# Patient Record
Sex: MALE | Race: WHITE | NOT HISPANIC OR LATINO | Employment: UNEMPLOYED | URBAN - NONMETROPOLITAN AREA
[De-identification: names, ages, dates, MRNs, and addresses within clinical notes are randomized per-mention and may not be internally consistent; named-entity substitution may affect disease eponyms.]

---

## 2017-03-24 ENCOUNTER — HOSPITAL ENCOUNTER (OUTPATIENT)
Dept: GENERAL RADIOLOGY | Facility: HOSPITAL | Age: 57
Discharge: HOME | End: 2017-03-24

## 2017-03-24 ENCOUNTER — HOSPITAL ENCOUNTER (OUTPATIENT)
Dept: GENERAL RADIOLOGY | Facility: HOSPITAL | Age: 57
Discharge: HOME | End: 2017-03-24
Admitting: FAMILY MEDICINE

## 2017-03-24 ENCOUNTER — TRANSCRIBE ORDERS (OUTPATIENT)
Dept: GENERAL RADIOLOGY | Facility: HOSPITAL | Age: 57
End: 2017-03-24

## 2017-03-24 DIAGNOSIS — R52 PAIN: ICD-10-CM

## 2017-03-24 DIAGNOSIS — R52 PAIN: Primary | ICD-10-CM

## 2017-03-24 PROCEDURE — 72110 X-RAY EXAM L-2 SPINE 4/>VWS: CPT

## 2017-03-24 PROCEDURE — 73502 X-RAY EXAM HIP UNI 2-3 VIEWS: CPT

## 2017-03-24 PROCEDURE — 72050 X-RAY EXAM NECK SPINE 4/5VWS: CPT

## 2017-03-29 ENCOUNTER — APPOINTMENT (OUTPATIENT)
Dept: MRI IMAGING | Facility: HOSPITAL | Age: 57
End: 2017-03-29

## 2017-03-29 ENCOUNTER — HOSPITAL ENCOUNTER (EMERGENCY)
Facility: HOSPITAL | Age: 57
Discharge: HOME | End: 2017-03-29
Attending: EMERGENCY MEDICINE | Admitting: EMERGENCY MEDICINE

## 2017-03-29 VITALS
SYSTOLIC BLOOD PRESSURE: 152 MMHG | HEIGHT: 72 IN | WEIGHT: 152 LBS | OXYGEN SATURATION: 96 % | DIASTOLIC BLOOD PRESSURE: 93 MMHG | HEART RATE: 78 BPM | RESPIRATION RATE: 18 BRPM | BODY MASS INDEX: 20.59 KG/M2 | TEMPERATURE: 98.1 F

## 2017-03-29 DIAGNOSIS — G89.29 CHRONIC RIGHT-SIDED LOW BACK PAIN WITH RIGHT-SIDED SCIATICA: ICD-10-CM

## 2017-03-29 DIAGNOSIS — M51.26 LUMBAR HERNIATED DISC: Primary | ICD-10-CM

## 2017-03-29 DIAGNOSIS — M54.41 CHRONIC RIGHT-SIDED LOW BACK PAIN WITH RIGHT-SIDED SCIATICA: ICD-10-CM

## 2017-03-29 LAB
ALBUMIN SERPL-MCNC: 4 G/DL (ref 3.2–4.8)
ALBUMIN/GLOB SERPL: 1.7 G/DL (ref 1.5–2.5)
ALP SERPL-CCNC: 140 U/L (ref 25–100)
ALT SERPL W P-5'-P-CCNC: 18 U/L (ref 7–40)
AMPHET+METHAMPHET UR QL: NEGATIVE
AMPHETAMINES UR QL: NEGATIVE
ANION GAP SERPL CALCULATED.3IONS-SCNC: 5 MMOL/L (ref 3–11)
APTT PPP: 25.6 SECONDS (ref 24–31)
AST SERPL-CCNC: 14 U/L (ref 0–33)
BACTERIA UR QL AUTO: ABNORMAL /HPF
BARBITURATES UR QL SCN: NEGATIVE
BASOPHILS # BLD AUTO: 0 10*3/MM3 (ref 0–0.2)
BASOPHILS NFR BLD AUTO: 0 % (ref 0–1)
BENZODIAZ UR QL SCN: POSITIVE
BILIRUB SERPL-MCNC: 0.1 MG/DL (ref 0.3–1.2)
BILIRUB UR QL STRIP: NEGATIVE
BUN BLD-MCNC: 23 MG/DL (ref 9–23)
BUN/CREAT SERPL: 17.7 (ref 7–25)
BUPRENORPHINE SERPL-MCNC: NEGATIVE NG/ML
CALCIUM SPEC-SCNC: 9.1 MG/DL (ref 8.7–10.4)
CANNABINOIDS SERPL QL: NEGATIVE
CHLORIDE SERPL-SCNC: 113 MMOL/L (ref 99–109)
CLARITY UR: ABNORMAL
CO2 SERPL-SCNC: 24 MMOL/L (ref 20–31)
COCAINE UR QL: NEGATIVE
COLOR UR: YELLOW
CREAT BLD-MCNC: 1.3 MG/DL (ref 0.6–1.3)
DEPRECATED RDW RBC AUTO: 55.9 FL (ref 37–54)
EOSINOPHIL # BLD AUTO: 0 10*3/MM3 (ref 0.1–0.3)
EOSINOPHIL NFR BLD AUTO: 0 % (ref 0–3)
ERYTHROCYTE [DISTWIDTH] IN BLOOD BY AUTOMATED COUNT: 14.8 % (ref 11.3–14.5)
GFR SERPL CREATININE-BSD FRML MDRD: 58 ML/MIN/1.73
GLOBULIN UR ELPH-MCNC: 2.3 GM/DL
GLUCOSE BLD-MCNC: 171 MG/DL (ref 70–100)
GLUCOSE UR STRIP-MCNC: NEGATIVE MG/DL
HCT VFR BLD AUTO: 40.2 % (ref 38.9–50.9)
HGB BLD-MCNC: 13.3 G/DL (ref 13.1–17.5)
HGB UR QL STRIP.AUTO: NEGATIVE
HOLD SPECIMEN: NORMAL
HOLD SPECIMEN: NORMAL
HYALINE CASTS UR QL AUTO: ABNORMAL /LPF
IMM GRANULOCYTES # BLD: 0.05 10*3/MM3 (ref 0–0.03)
IMM GRANULOCYTES NFR BLD: 0.4 % (ref 0–0.6)
INR PPP: 0.89
KETONES UR QL STRIP: NEGATIVE
LEUKOCYTE ESTERASE UR QL STRIP.AUTO: NEGATIVE
LYMPHOCYTES # BLD AUTO: 0.77 10*3/MM3 (ref 0.6–4.8)
LYMPHOCYTES NFR BLD AUTO: 6.4 % (ref 24–44)
MCH RBC QN AUTO: 34.3 PG (ref 27–31)
MCHC RBC AUTO-ENTMCNC: 33.1 G/DL (ref 32–36)
MCV RBC AUTO: 103.6 FL (ref 80–99)
METHADONE UR QL SCN: NEGATIVE
MONOCYTES # BLD AUTO: 0.08 10*3/MM3 (ref 0–1)
MONOCYTES NFR BLD AUTO: 0.7 % (ref 0–12)
NEUTROPHILS # BLD AUTO: 11.2 10*3/MM3 (ref 1.5–8.3)
NEUTROPHILS NFR BLD AUTO: 92.5 % (ref 41–71)
NITRITE UR QL STRIP: NEGATIVE
OPIATES UR QL: POSITIVE
OXYCODONE UR QL SCN: NEGATIVE
PCP UR QL SCN: NEGATIVE
PH UR STRIP.AUTO: 6.5 [PH] (ref 5–8)
PLATELET # BLD AUTO: 229 10*3/MM3 (ref 150–450)
PMV BLD AUTO: 10.6 FL (ref 6–12)
POTASSIUM BLD-SCNC: 4.5 MMOL/L (ref 3.5–5.5)
PROPOXYPH UR QL: NEGATIVE
PROT SERPL-MCNC: 6.3 G/DL (ref 5.7–8.2)
PROT UR QL STRIP: ABNORMAL
PROTHROMBIN TIME: 9.7 SECONDS (ref 9.6–11.5)
RBC # BLD AUTO: 3.88 10*6/MM3 (ref 4.2–5.76)
RBC # UR: ABNORMAL /HPF
REF LAB TEST METHOD: ABNORMAL
SODIUM BLD-SCNC: 142 MMOL/L (ref 132–146)
SP GR UR STRIP: >=1.03 (ref 1–1.03)
SQUAMOUS #/AREA URNS HPF: ABNORMAL /HPF
TRICYCLICS UR QL SCN: NEGATIVE
UROBILINOGEN UR QL STRIP: ABNORMAL
WBC NRBC COR # BLD: 12.1 10*3/MM3 (ref 3.5–10.8)
WBC UR QL AUTO: ABNORMAL /HPF
WHOLE BLOOD HOLD SPECIMEN: NORMAL
WHOLE BLOOD HOLD SPECIMEN: NORMAL

## 2017-03-29 PROCEDURE — 96374 THER/PROPH/DIAG INJ IV PUSH: CPT

## 2017-03-29 PROCEDURE — 96375 TX/PRO/DX INJ NEW DRUG ADDON: CPT

## 2017-03-29 PROCEDURE — 99284 EMERGENCY DEPT VISIT MOD MDM: CPT

## 2017-03-29 PROCEDURE — 85025 COMPLETE CBC W/AUTO DIFF WBC: CPT | Performed by: PHYSICIAN ASSISTANT

## 2017-03-29 PROCEDURE — 25010000002 HYDROMORPHONE PER 4 MG: Performed by: EMERGENCY MEDICINE

## 2017-03-29 PROCEDURE — 80306 DRUG TEST PRSMV INSTRMNT: CPT | Performed by: PHYSICIAN ASSISTANT

## 2017-03-29 PROCEDURE — 85730 THROMBOPLASTIN TIME PARTIAL: CPT | Performed by: PHYSICIAN ASSISTANT

## 2017-03-29 PROCEDURE — 81001 URINALYSIS AUTO W/SCOPE: CPT | Performed by: PHYSICIAN ASSISTANT

## 2017-03-29 PROCEDURE — 25010000002 LORAZEPAM PER 2 MG: Performed by: EMERGENCY MEDICINE

## 2017-03-29 PROCEDURE — 0 GADOBENATE DIMEGLUMINE 529 MG/ML SOLUTION: Performed by: EMERGENCY MEDICINE

## 2017-03-29 PROCEDURE — 96361 HYDRATE IV INFUSION ADD-ON: CPT

## 2017-03-29 PROCEDURE — 72158 MRI LUMBAR SPINE W/O & W/DYE: CPT

## 2017-03-29 PROCEDURE — 80053 COMPREHEN METABOLIC PANEL: CPT | Performed by: PHYSICIAN ASSISTANT

## 2017-03-29 PROCEDURE — A9577 INJ MULTIHANCE: HCPCS | Performed by: EMERGENCY MEDICINE

## 2017-03-29 PROCEDURE — 85610 PROTHROMBIN TIME: CPT | Performed by: PHYSICIAN ASSISTANT

## 2017-03-29 PROCEDURE — 96376 TX/PRO/DX INJ SAME DRUG ADON: CPT

## 2017-03-29 RX ORDER — OXYCODONE AND ACETAMINOPHEN 10; 325 MG/1; MG/1
1 TABLET ORAL EVERY 6 HOURS PRN
Qty: 24 TABLET | Refills: 0 | Status: SHIPPED | OUTPATIENT
Start: 2017-03-29 | End: 2017-05-18

## 2017-03-29 RX ORDER — CARBAMAZEPINE 200 MG/1
200 TABLET ORAL NIGHTLY
COMMUNITY
End: 2017-05-18

## 2017-03-29 RX ORDER — PREDNISONE 10 MG/1
10 TABLET ORAL DAILY
COMMUNITY

## 2017-03-29 RX ORDER — LORATADINE 10 MG/1
1 CAPSULE, LIQUID FILLED ORAL
COMMUNITY

## 2017-03-29 RX ORDER — LORAZEPAM 2 MG/ML
0.5 INJECTION INTRAMUSCULAR ONCE
Status: COMPLETED | OUTPATIENT
Start: 2017-03-29 | End: 2017-03-29

## 2017-03-29 RX ORDER — HYDROXYZINE HYDROCHLORIDE 25 MG/1
25 TABLET, FILM COATED ORAL 3 TIMES DAILY PRN
COMMUNITY

## 2017-03-29 RX ORDER — HYDROCODONE BITARTRATE AND ACETAMINOPHEN 10; 325 MG/1; MG/1
1 TABLET ORAL EVERY 8 HOURS
COMMUNITY

## 2017-03-29 RX ORDER — RANITIDINE 150 MG/1
150 TABLET ORAL 2 TIMES DAILY
COMMUNITY

## 2017-03-29 RX ORDER — SUMATRIPTAN 100 MG/1
100 TABLET, FILM COATED ORAL
COMMUNITY

## 2017-03-29 RX ORDER — TIZANIDINE 4 MG/1
4 TABLET ORAL NIGHTLY PRN
COMMUNITY

## 2017-03-29 RX ORDER — GABAPENTIN 800 MG/1
800 TABLET ORAL 4 TIMES DAILY
COMMUNITY

## 2017-03-29 RX ORDER — DIAZEPAM 10 MG/1
10 TABLET ORAL 2 TIMES DAILY
COMMUNITY

## 2017-03-29 RX ORDER — ESOMEPRAZOLE MAGNESIUM 40 MG/1
40 CAPSULE, DELAYED RELEASE ORAL
COMMUNITY

## 2017-03-29 RX ORDER — PROPRANOLOL HYDROCHLORIDE 80 MG/1
80 CAPSULE, EXTENDED RELEASE ORAL DAILY
COMMUNITY

## 2017-03-29 RX ORDER — METHYLPREDNISOLONE 4 MG/1
TABLET ORAL
Qty: 21 TABLET | Refills: 0 | Status: SHIPPED | OUTPATIENT
Start: 2017-03-29 | End: 2017-05-18

## 2017-03-29 RX ORDER — SODIUM CHLORIDE 0.9 % (FLUSH) 0.9 %
10 SYRINGE (ML) INJECTION AS NEEDED
Status: DISCONTINUED | OUTPATIENT
Start: 2017-03-29 | End: 2017-03-30 | Stop reason: HOSPADM

## 2017-03-29 RX ORDER — DICLOFENAC SODIUM 75 MG/1
75 TABLET, DELAYED RELEASE ORAL 2 TIMES DAILY
COMMUNITY

## 2017-03-29 RX ADMIN — LORAZEPAM 0.5 MG: 2 INJECTION, SOLUTION INTRAMUSCULAR; INTRAVENOUS at 18:21

## 2017-03-29 RX ADMIN — HYDROMORPHONE HYDROCHLORIDE 1 MG: 1 INJECTION, SOLUTION INTRAMUSCULAR; INTRAVENOUS; SUBCUTANEOUS at 18:17

## 2017-03-29 RX ADMIN — GADOBENATE DIMEGLUMINE 15 ML: 529 INJECTION, SOLUTION INTRAVENOUS at 19:33

## 2017-03-29 RX ADMIN — SODIUM CHLORIDE 1000 ML: 9 INJECTION, SOLUTION INTRAVENOUS at 20:01

## 2017-03-29 RX ADMIN — HYDROMORPHONE HYDROCHLORIDE 1 MG: 1 INJECTION, SOLUTION INTRAMUSCULAR; INTRAVENOUS; SUBCUTANEOUS at 18:58

## 2017-04-04 ENCOUNTER — DOCUMENTATION (OUTPATIENT)
Dept: NEUROSURGERY | Facility: CLINIC | Age: 57
End: 2017-04-04

## 2017-04-04 DIAGNOSIS — M43.16 SPONDYLOLISTHESIS AT L3-L4 LEVEL: ICD-10-CM

## 2017-04-04 DIAGNOSIS — M51.26 HNP (HERNIATED NUCLEUS PULPOSUS), LUMBAR: Primary | ICD-10-CM

## 2017-04-04 DIAGNOSIS — M43.10 ACQUIRED SPONDYLOLISTHESIS: ICD-10-CM

## 2017-04-04 DIAGNOSIS — M48.061 LUMBAR STENOSIS: ICD-10-CM

## 2017-05-18 ENCOUNTER — OFFICE VISIT (OUTPATIENT)
Dept: NEUROSURGERY | Facility: CLINIC | Age: 57
End: 2017-05-18

## 2017-05-18 VITALS
SYSTOLIC BLOOD PRESSURE: 110 MMHG | TEMPERATURE: 98.4 F | HEIGHT: 72 IN | DIASTOLIC BLOOD PRESSURE: 76 MMHG | WEIGHT: 170 LBS | BODY MASS INDEX: 23.03 KG/M2

## 2017-05-18 DIAGNOSIS — M51.26 HNP (HERNIATED NUCLEUS PULPOSUS), LUMBAR: ICD-10-CM

## 2017-05-18 DIAGNOSIS — M48.061 LUMBAR STENOSIS: ICD-10-CM

## 2017-05-18 DIAGNOSIS — M43.16 SPONDYLOLISTHESIS AT L3-L4 LEVEL: Primary | ICD-10-CM

## 2017-05-18 PROCEDURE — 99213 OFFICE O/P EST LOW 20 MIN: CPT | Performed by: PHYSICIAN ASSISTANT

## 2017-06-05 ENCOUNTER — DOCUMENTATION (OUTPATIENT)
Dept: NEUROSURGERY | Facility: CLINIC | Age: 57
End: 2017-06-05

## 2017-06-05 DIAGNOSIS — M43.16 SPONDYLOLISTHESIS AT L3-L4 LEVEL: Primary | ICD-10-CM

## 2017-06-05 NOTE — PROGRESS NOTES
"I explained in detail to Dr. Young was said in my office visit with Mr. Pitts.  With Mr. Pitts saying, \"I lack confidence with Dr. Young and did not want to have surgery with him again\".  With this understanding Dr. Young thinks that the patient should go for a second opinion to provider outside of our practice.  I discussed with the patient in the office visit that we do not typically refer patient's among our practice for second opinion due to very similar practice habits.  We will send his paperwork and necessary documentation to where he chooses.  However, I do not think with the current relationship that he and  Dr. Young share that it is in the patient's best interest for our practice to continue care.   "

## 2023-07-19 ENCOUNTER — HOSPITAL ENCOUNTER (EMERGENCY)
Facility: HOSPITAL | Age: 63
Discharge: HOME | End: 2023-07-19
Attending: EMERGENCY MEDICINE | Admitting: EMERGENCY MEDICINE
Payer: MEDICARE

## 2023-07-19 VITALS
WEIGHT: 160.8 LBS | RESPIRATION RATE: 18 BRPM | HEART RATE: 106 BPM | SYSTOLIC BLOOD PRESSURE: 150 MMHG | TEMPERATURE: 97.9 F | HEIGHT: 71 IN | OXYGEN SATURATION: 97 % | BODY MASS INDEX: 22.51 KG/M2 | DIASTOLIC BLOOD PRESSURE: 121 MMHG
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
## 2023-07-19 DIAGNOSIS — S01.312A TEAR OF LEFT EARLOBE, INITIAL ENCOUNTER: Primary | ICD-10-CM

## 2023-07-19 PROCEDURE — 99283 EMERGENCY DEPT VISIT LOW MDM: CPT

## 2023-07-19 PROCEDURE — 25010000002 TETANUS-DIPHTH-ACELL PERTUSSIS 5-2.5-18.5 LF-MCG/0.5 SUSPENSION PREFILLED SYRINGE: Performed by: PHYSICIAN ASSISTANT

## 2023-07-19 PROCEDURE — 90715 TDAP VACCINE 7 YRS/> IM: CPT | Performed by: PHYSICIAN ASSISTANT

## 2023-07-19 PROCEDURE — 90471 IMMUNIZATION ADMIN: CPT | Performed by: PHYSICIAN ASSISTANT

## 2023-07-19 RX ORDER — AMOXICILLIN AND CLAVULANATE POTASSIUM 875; 125 MG/1; MG/1
1 TABLET, FILM COATED ORAL ONCE
Status: COMPLETED | OUTPATIENT
Start: 2023-07-19 | End: 2023-07-19

## 2023-07-19 RX ORDER — AMOXICILLIN AND CLAVULANATE POTASSIUM 875; 125 MG/1; MG/1
1 TABLET, FILM COATED ORAL 2 TIMES DAILY
Qty: 13 TABLET | Refills: 0 | Status: SHIPPED | OUTPATIENT
Start: 2023-07-19 | End: 2023-07-26

## 2023-07-19 RX ADMIN — TETANUS TOXOID, REDUCED DIPHTHERIA TOXOID AND ACELLULAR PERTUSSIS VACCINE, ADSORBED 0.5 ML: 5; 2.5; 8; 8; 2.5 SUSPENSION INTRAMUSCULAR at 09:51

## 2023-07-19 RX ADMIN — AMOXICILLIN AND CLAVULANATE POTASSIUM 1 TABLET: 875; 125 TABLET, FILM COATED ORAL at 09:51

## 2023-07-19 NOTE — ED PROVIDER NOTES
Subjective:  Chief Complaint:  Dog bite    History of Present Illness:  Patient is a 63-year-old male with history of GERD, fractures, hypertension, migraines, among others presenting to the ER with complaints of a dog bite to his left ear.  Patient states the dogs were up-to-date on rabies vaccines.  He states that he was trying to break up a dog fight and got bit on the left earlobe.  He states he does not feel there is anything to suture as the chunk of the earlobe is missing.  He states that it was just bleeding heavily and he thought he may need a bandage.  Also is unsure if his tetanus is up-to-date.  Denies any allergies to any antibiotics.      Nurses Notes reviewed and agree, including vitals, allergies, social history and prior medical history.     REVIEW OF SYSTEMS: All systems reviewed and not pertinent unless noted.  Review of Systems   HENT:  Positive for ear pain.    All other systems reviewed and are negative.    Past Medical History:   Diagnosis Date    Allergic     Chronic back pain     GERD (gastroesophageal reflux disease)     Hx of fracture of fibula     right leg    Hx of fracture of finger     right hand    Hx of fracture of left hip     Hx of fracture of tibia     right    Hypertension     Migraines        Allergies:    Codeine      Past Surgical History:   Procedure Laterality Date    LUMBAR DECOMPRESSION  08/06/2015    Extension of fusion with decompression at the L3-L4 level (Dr. Young)    LUMBAR FUSION  2007    Fusion L4/5 (in [place])         Social History     Socioeconomic History    Marital status:    Tobacco Use    Smoking status: Every Day     Packs/day: 1.00     Types: Cigarettes   Substance and Sexual Activity    Alcohol use: No    Drug use: No    Sexual activity: Defer         History reviewed. No pertinent family history.    Objective  Physical Exam:  BP (!) 150/121 (BP Location: Left arm, Patient Position: Sitting)   Pulse 106   Temp 97.9 °F (36.6 °C) (Oral)   Resp  "18   Ht 180.3 cm (71\")   Wt 72.9 kg (160 lb 12.8 oz)   SpO2 97%   BMI 22.43 kg/m²      Physical Exam  Vitals and nursing note reviewed.   Constitutional:       General: He is not in acute distress.     Appearance: He is not toxic-appearing.   HENT:      Head: Normocephalic and atraumatic.      Right Ear: External ear normal.      Ears:      Comments: Avulsion injury to left earlobe, active bleeding     Nose: Nose normal.   Eyes:      Extraocular Movements: Extraocular movements intact.      Conjunctiva/sclera: Conjunctivae normal.   Cardiovascular:      Rate and Rhythm: Tachycardia present.   Pulmonary:      Effort: Pulmonary effort is normal. No respiratory distress.   Abdominal:      General: There is no distension.      Palpations: Abdomen is soft.   Musculoskeletal:         General: Normal range of motion.      Cervical back: Normal range of motion and neck supple.   Skin:     General: Skin is warm and dry.   Neurological:      General: No focal deficit present.      Mental Status: He is alert and oriented to person, place, and time.   Psychiatric:         Mood and Affect: Mood normal.         Behavior: Behavior normal.       Laceration Repair    Date/Time: 7/19/2023 9:58 AM  Performed by: Pamela Astorga PA-C  Authorized by: Mikki Mullen MD     Consent:     Consent obtained:  Verbal    Consent given by:  Patient    Risks, benefits, and alternatives were discussed: yes      Risks discussed:  Infection, poor cosmetic result, pain and poor wound healing  Universal protocol:     Patient identity confirmed:  Verbally with patient  Anesthesia:     Anesthesia method:  Local infiltration    Local anesthetic:  Lidocaine 1% w/o epi  Laceration details:     Location:  Ear    Ear location:  L ear    Length (cm):  2    Depth (mm):  2  Pre-procedure details:     Preparation:  Patient was prepped and draped in usual sterile fashion  Exploration:     Hemostasis achieved with:  Direct pressure    Wound " exploration: wound explored through full range of motion      Wound extent: no foreign bodies/material noted    Treatment:     Area cleansed with:  Chlorhexidine    Amount of cleaning:  Extensive    Irrigation solution:  Sterile saline    Irrigation volume:  20mL    Irrigation method:  Syringe    Debridement:  None    Undermining:  None  Skin repair:     Repair method:  Sutures    Suture size:  6-0    Suture material:  Nylon    Suture technique:  Figure eight and simple interrupted    Number of sutures:  6  Approximation:     Approximation:  Close  Repair type:     Repair type:  Simple  Post-procedure details:     Dressing: kerlix and ace bandage.    Procedure completion:  Tolerated well, no immediate complications    ED Course:         Lab Results (last 24 hours)       ** No results found for the last 24 hours. **             No radiology results from the last 24 hrs       MDM  Patient was evaluated in the ER for left earlobe laceration after dog bite.  It appears to be a avulsion injury.  He is hypertensive and mildly tachycardic at 106 bpm but otherwise hemodynamically stable, afebrile, alert and oriented.  Differential diagnosis includes but is not limited to superficial laceration, complex laceration, avulsion of earlobe, among others.  Initial plan includes tetanus update, initiation of Augmentin with first dose given in the ER.  There is nothing to suture on the ear.  Will plan to clean with chlorhexidine and place Surgicel and pressure dressing on the wound.    Cleaned the earlobe and try to place Surgicel but it bled through the Surgicel.  Upon further evaluation, it appears there was a small arterial bleed from the earlobe.  Given this, 2 figure-of-eight stitches were placed as well as some simple interrupted.  Bleeding did stop.  Pressure dressing was placed over the ear and patient advised on wound care and advised to leave the pressure dressing in place for 1 to 2 days.  He was advised to have wound  rechecked and sutures removed in 5 to 7 days.  He was given a prescription for Augmentin.  Advised to return to the ER for any new or worsening symptoms or acute concerns.    Final diagnoses:   Tear of left earlobe, initial encounter   Dog bite, initial encounter          Pamela Astorga PA-C  07/19/23 1002

## 2023-07-19 NOTE — DISCHARGE INSTRUCTIONS
Keep pressure dressing in place for 1 to 2 days.  Leave sutures in place for 5 to 7 days.  Have wound rechecked and sutures removed in 5 to 7 days.  Keep the area clean.  Take antibiotics as prescribed.  Follow-up with your PCP as needed.  Return to the ER for any new or worsening symptoms or acute concerns.

## 2023-08-18 ENCOUNTER — TRANSCRIBE ORDERS (OUTPATIENT)
Dept: ADMINISTRATIVE | Facility: HOSPITAL | Age: 63
End: 2023-08-18
Payer: MEDICARE

## 2023-08-18 DIAGNOSIS — E78.5 HYPERLIPIDEMIA, UNSPECIFIED HYPERLIPIDEMIA TYPE: Primary | ICD-10-CM

## 2023-08-18 DIAGNOSIS — R10.9 ABDOMINAL PAIN, UNSPECIFIED ABDOMINAL LOCATION: ICD-10-CM

## 2023-08-18 DIAGNOSIS — K21.9 GERD WITHOUT ESOPHAGITIS: ICD-10-CM

## 2023-08-18 DIAGNOSIS — R53.83 FATIGUE, UNSPECIFIED TYPE: ICD-10-CM

## 2023-08-21 ENCOUNTER — TRANSCRIBE ORDERS (OUTPATIENT)
Dept: ADMINISTRATIVE | Facility: HOSPITAL | Age: 63
End: 2023-08-21
Payer: MEDICARE

## 2023-08-21 DIAGNOSIS — Z87.891 PERSONAL HISTORY OF NICOTINE DEPENDENCE: ICD-10-CM

## 2023-08-21 DIAGNOSIS — Z12.2 ENCOUNTER FOR SCREENING FOR MALIGNANT NEOPLASM OF RESPIRATORY ORGANS: Primary | ICD-10-CM

## 2023-08-31 ENCOUNTER — HOSPITAL ENCOUNTER (OUTPATIENT)
Dept: CT IMAGING | Facility: HOSPITAL | Age: 63
Discharge: HOME | End: 2023-08-31
Payer: MEDICARE

## 2023-08-31 DIAGNOSIS — Z12.2 ENCOUNTER FOR SCREENING FOR MALIGNANT NEOPLASM OF RESPIRATORY ORGANS: ICD-10-CM

## 2023-08-31 DIAGNOSIS — K21.9 GERD WITHOUT ESOPHAGITIS: ICD-10-CM

## 2023-08-31 DIAGNOSIS — Z87.891 PERSONAL HISTORY OF NICOTINE DEPENDENCE: ICD-10-CM

## 2023-08-31 DIAGNOSIS — R10.9 ABDOMINAL PAIN, UNSPECIFIED ABDOMINAL LOCATION: ICD-10-CM

## 2023-08-31 DIAGNOSIS — E78.5 HYPERLIPIDEMIA, UNSPECIFIED HYPERLIPIDEMIA TYPE: ICD-10-CM

## 2023-08-31 DIAGNOSIS — R53.83 FATIGUE, UNSPECIFIED TYPE: ICD-10-CM

## 2023-08-31 PROCEDURE — 74176 CT ABD & PELVIS W/O CONTRAST: CPT

## 2023-08-31 PROCEDURE — 71271 CT THORAX LUNG CANCER SCR C-: CPT

## 2023-10-03 ENCOUNTER — OFFICE VISIT (OUTPATIENT)
Dept: UROLOGY | Facility: CLINIC | Age: 63
End: 2023-10-03
Payer: MEDICARE

## 2023-10-03 VITALS
HEART RATE: 98 BPM | BODY MASS INDEX: 22.4 KG/M2 | WEIGHT: 160 LBS | HEIGHT: 71 IN | OXYGEN SATURATION: 97 % | DIASTOLIC BLOOD PRESSURE: 90 MMHG | SYSTOLIC BLOOD PRESSURE: 124 MMHG | TEMPERATURE: 99 F

## 2023-10-03 DIAGNOSIS — N20.0 KIDNEY STONES: Primary | ICD-10-CM

## 2023-10-03 LAB
BILIRUB BLD-MCNC: NEGATIVE MG/DL
CLARITY, POC: CLEAR
COLOR UR: YELLOW
EXPIRATION DATE: ABNORMAL
GLUCOSE UR STRIP-MCNC: NEGATIVE MG/DL
KETONES UR QL: NEGATIVE
LEUKOCYTE EST, POC: NEGATIVE
Lab: ABNORMAL
NITRITE UR-MCNC: NEGATIVE MG/ML
PH UR: 5 [PH] (ref 5–8)
PROT UR STRIP-MCNC: ABNORMAL MG/DL
RBC # UR STRIP: NEGATIVE /UL
SP GR UR: 1.02 (ref 1–1.03)
UROBILINOGEN UR QL: NORMAL

## 2023-10-03 PROCEDURE — 1159F MED LIST DOCD IN RCRD: CPT | Performed by: NURSE PRACTITIONER

## 2023-10-03 PROCEDURE — 81003 URINALYSIS AUTO W/O SCOPE: CPT | Performed by: NURSE PRACTITIONER

## 2023-10-03 PROCEDURE — 1160F RVW MEDS BY RX/DR IN RCRD: CPT | Performed by: NURSE PRACTITIONER

## 2023-10-03 PROCEDURE — 99203 OFFICE O/P NEW LOW 30 MIN: CPT | Performed by: NURSE PRACTITIONER

## 2023-10-03 RX ORDER — PANTOPRAZOLE SODIUM 40 MG/1
40 TABLET, DELAYED RELEASE ORAL DAILY
COMMUNITY

## 2023-10-03 RX ORDER — ATORVASTATIN CALCIUM 20 MG/1
20 TABLET, FILM COATED ORAL DAILY
COMMUNITY

## 2023-10-03 NOTE — PROGRESS NOTES
Office Visit New Stone     Patient Name: Redd Pitts  : 1960   MRN: [id number]     Chief Complaint: Kidney Stones.    Chief Complaint   Patient presents with    Nephrolithiasis     7 cm kidney stone       Referring Provider: Shazia Espinoza APRN    History of Present Illness: Redd Pitts is a 63 y.o. male who presents today for further management of nephrolithiasis.  male presented to PCP and was diagnosed with a bilateral nephrolithiasis with the largest stone on the right of 7 mm stone. male is asymptomatic. No fever, chills, nausea, vomiting, hematuria, flank pain, dysuria.     Stone prevention medications: none    Stone related history  Family history of stones:   no  Renal disease or anatomic abnormality: no  Malabsorptive disease or gastric bypass: no  Frequent UTI's    no  Parathyroid disease    no    Dietary Considerations  Soda - 0 per day  Fast food - 0 per week  Water - 4 glasses per day  Yes  Adds salt to foods    Subjective      Review of System:   As noted in HPI      Past Medical History:   Past Medical History:   Diagnosis Date    Allergic     Chronic back pain     GERD (gastroesophageal reflux disease)     Hx of fracture of fibula     right leg    Hx of fracture of finger     right hand    Hx of fracture of left hip     Hx of fracture of tibia     right    Hypertension     Migraines        Past Surgical History:   Past Surgical History:   Procedure Laterality Date    LUMBAR DECOMPRESSION  2015    Extension of fusion with decompression at the L3-L4 level (Dr. Young)    LUMBAR FUSION  2007    Fusion L4/5 (in [place])       Family History: History reviewed. No pertinent family history.    Social History:   Social History     Socioeconomic History    Marital status:    Tobacco Use    Smoking status: Every Day     Packs/day: 1.00     Types: Cigarettes   Substance and Sexual Activity    Alcohol use: No    Drug use: No    Sexual activity: Defer       Medications:      Current Outpatient Medications:     atorvastatin (LIPITOR) 20 MG tablet, Take 1 tablet by mouth Daily., Disp: , Rfl:     Loratadine 10 MG capsule, Take 1 tablet/day by mouth., Disp: , Rfl:     pantoprazole (PROTONIX) 40 MG EC tablet, Take 1 tablet by mouth Daily., Disp: , Rfl:     diazePAM (VALIUM) 10 MG tablet, Take 10 mg by mouth 2 (Two) Times a Day. (Patient not taking: Reported on 10/3/2023), Disp: , Rfl:     diclofenac (VOLTAREN) 75 MG EC tablet, Take 75 mg by mouth 2 (Two) Times a Day. (Patient not taking: Reported on 10/3/2023), Disp: , Rfl:     esomeprazole (nexIUM) 40 MG capsule, Take 40 mg by mouth Every Morning Before Breakfast. (Patient not taking: Reported on 10/3/2023), Disp: , Rfl:     gabapentin (NEURONTIN) 800 MG tablet, Take 800 mg by mouth 4 (Four) Times a Day. (Patient not taking: Reported on 10/3/2023), Disp: , Rfl:     HYDROcodone-acetaminophen (NORCO)  MG per tablet, Take 1 tablet by mouth Every 8 (Eight) Hours. (Patient not taking: Reported on 10/3/2023), Disp: , Rfl:     hydrOXYzine (ATARAX) 25 MG tablet, Take 25 mg by mouth 3 (Three) Times a Day As Needed for Itching. (Patient not taking: Reported on 10/3/2023), Disp: , Rfl:     predniSONE (DELTASONE) 10 MG tablet, Take 10 mg by mouth Daily. (Patient not taking: Reported on 10/3/2023), Disp: , Rfl:     propranolol LA (INDERAL LA) 80 MG 24 hr capsule, Take 80 mg by mouth Daily., Disp: , Rfl:     raNITIdine (ZANTAC) 150 MG tablet, Take 150 mg by mouth 2 (Two) Times a Day. (Patient not taking: Reported on 10/3/2023), Disp: , Rfl:     SUMAtriptan (IMITREX) 100 MG tablet, Take 100 mg by mouth Every 2 (Two) Hours As Needed for migraine. (Patient not taking: Reported on 10/3/2023), Disp: , Rfl:     tiZANidine (ZANAFLEX) 4 MG tablet, Take 4 mg by mouth At Night As Needed for Muscle Spasms. (Patient not taking: Reported on 10/3/2023), Disp: , Rfl:     Allergies:   Allergies   Allergen Reactions    Codeine GI Intolerance       Objective  "    Physical Exam:   Vital Signs:   Vitals:    10/03/23 1523   BP: 124/90   BP Location: Right arm   Patient Position: Sitting   Cuff Size: Adult   Pulse: 98   Temp: 99 °F (37.2 °C)   TempSrc: Temporal   SpO2: 97%   Weight: 72.6 kg (160 lb)   Height: 180.3 cm (70.98\")     Body mass index is 22.33 kg/m².     Physical Exam  Constitutional: NAD, WDWN.   Neurological: A + O x 3    Psychiatric:  Normal mood and affect      Labs  Lab Results   Component Value Date    GLUCOSE 171 (H) 03/29/2017    BUN 23 03/29/2017    CREATININE 1.30 03/29/2017    EGFRIFNONA 58 (L) 03/29/2017    BCR 17.7 03/29/2017    K 4.5 03/29/2017    CO2 24.0 03/29/2017    CALCIUM 9.1 03/29/2017    ALBUMIN 4.00 03/29/2017    AST 14 03/29/2017    ALT 18 03/29/2017       Lab Results   Component Value Date    WBC 12.10 (H) 03/29/2017    HGB 13.3 03/29/2017    HCT 40.2 03/29/2017    .6 (H) 03/29/2017     03/29/2017       No results found for: LDH, URICACID    Lab Results   Component Value Date    CALCIUM 9.1 03/29/2017       Brief Urine Lab Results  (Last result in the past 365 days)        Color   Clarity   Blood   Leuk Est   Nitrite   Protein   CREAT   Urine HCG        10/03/23 1545 Yellow   Clear   Negative   Negative   Negative   1+                   No results found for: URINECX    )No components found for: STONEANALYSI      Radiologic Studies  CT Abdomen Pelvis Without Contrast    Result Date: 8/31/2023  Bilateral nephrolithiasis without hydronephrosis.  Normal appendix.   This report was signed and finalized on 8/31/2023 5:27 PM by Qi Guzman MD.      CT Chest Low Dose Cancer Screening WO    Result Date: 8/31/2023  Lung RADS category 2: Benign.  RECOMMENDATIONS:Annual low-dose chest CT.  This report was signed and finalized on 8/31/2023 5:09 PM by Qi Guzman MD.        I have personally reviewed these labs and images.    Assessment / Plan      Assessment  Mr. Pitts is a 63 y.o. male with bilateral nephrolithiasis. We reviewed the " CT report does say 7 cm this is a miss print I personally reviewed imaging and measured stone largest stone is in the left kidney upper pole and measures 7 mm.  We discussed bilateral nephrolithiasis is and not obstructing patient has an 11% chance within a year he will pass a stone and the 7 mm stone has less than a 50% chance of passing on its own.  At this time patient feels he has other health conditions such as the nodule on his lung and his ulcer that are more pressing and he is not interested in prophylactic surgical management of stones at this time.  He does agree to yearly monitor stone burden with a KUB.  We also discussed dietary considerations such as increased fluid intake to at least 2.5 L daily and decreasing sodium in diet      Plan  1.  Encouraged increased fluid intake to 2.5 L daily   2.  KUB 1 year      Follow Up:   Return in about 1 year (around 10/3/2024) for Follow up YUMIKO Jay, NP-C  [facility]

## 2023-10-13 ENCOUNTER — HOSPITAL ENCOUNTER (EMERGENCY)
Facility: HOSPITAL | Age: 63
Discharge: HOME | End: 2023-10-14
Attending: EMERGENCY MEDICINE
Payer: MEDICARE

## 2023-10-13 ENCOUNTER — APPOINTMENT (OUTPATIENT)
Dept: CT IMAGING | Facility: HOSPITAL | Age: 63
End: 2023-10-13
Payer: MEDICARE

## 2023-10-13 DIAGNOSIS — K27.9 PEPTIC ULCER DISEASE: ICD-10-CM

## 2023-10-13 DIAGNOSIS — R10.13 EPIGASTRIC PAIN: Primary | ICD-10-CM

## 2023-10-13 LAB
ALBUMIN SERPL-MCNC: 5.2 G/DL (ref 3.5–5.2)
ALBUMIN/GLOB SERPL: 1.8 G/DL
ALP SERPL-CCNC: 156 U/L (ref 39–117)
ALT SERPL W P-5'-P-CCNC: <5 U/L (ref 1–41)
ANION GAP SERPL CALCULATED.3IONS-SCNC: 29.8 MMOL/L (ref 5–15)
AST SERPL-CCNC: 14 U/L (ref 1–40)
BACTERIA UR QL AUTO: ABNORMAL /HPF
BASOPHILS # BLD AUTO: 0.05 10*3/MM3 (ref 0–0.2)
BASOPHILS NFR BLD AUTO: 0.3 % (ref 0–1.5)
BILIRUB SERPL-MCNC: <0.2 MG/DL (ref 0–1.2)
BILIRUB UR QL STRIP: NEGATIVE
BUN SERPL-MCNC: 21 MG/DL (ref 8–23)
BUN/CREAT SERPL: 13 (ref 7–25)
CALCIUM SPEC-SCNC: 11 MG/DL (ref 8.6–10.5)
CHLORIDE SERPL-SCNC: 102 MMOL/L (ref 98–107)
CLARITY UR: CLEAR
CO2 SERPL-SCNC: 7.2 MMOL/L (ref 22–29)
COLOR UR: YELLOW
CREAT SERPL-MCNC: 1.61 MG/DL (ref 0.76–1.27)
DEPRECATED RDW RBC AUTO: 62.3 FL (ref 37–54)
EGFRCR SERPLBLD CKD-EPI 2021: 48.4 ML/MIN/1.73
EOSINOPHIL # BLD AUTO: 0.01 10*3/MM3 (ref 0–0.4)
EOSINOPHIL NFR BLD AUTO: 0.1 % (ref 0.3–6.2)
ERYTHROCYTE [DISTWIDTH] IN BLOOD BY AUTOMATED COUNT: 15.8 % (ref 12.3–15.4)
GLOBULIN UR ELPH-MCNC: 2.9 GM/DL
GLUCOSE SERPL-MCNC: 70 MG/DL (ref 65–99)
GLUCOSE UR STRIP-MCNC: NEGATIVE MG/DL
HCT VFR BLD AUTO: 52.1 % (ref 37.5–51)
HGB BLD-MCNC: 16.9 G/DL (ref 13–17.7)
HGB UR QL STRIP.AUTO: NEGATIVE
HOLD SPECIMEN: NORMAL
HYALINE CASTS UR QL AUTO: ABNORMAL /LPF
IMM GRANULOCYTES # BLD AUTO: 0.19 10*3/MM3 (ref 0–0.05)
IMM GRANULOCYTES NFR BLD AUTO: 1.3 % (ref 0–0.5)
KETONES UR QL STRIP: ABNORMAL
LEUKOCYTE ESTERASE UR QL STRIP.AUTO: NEGATIVE
LIPASE SERPL-CCNC: 109 U/L (ref 13–60)
LYMPHOCYTES # BLD AUTO: 2.73 10*3/MM3 (ref 0.7–3.1)
LYMPHOCYTES NFR BLD AUTO: 18.2 % (ref 19.6–45.3)
MACROCYTES BLD QL SMEAR: NORMAL
MCH RBC QN AUTO: 34.2 PG (ref 26.6–33)
MCHC RBC AUTO-ENTMCNC: 32.4 G/DL (ref 31.5–35.7)
MCV RBC AUTO: 105.5 FL (ref 79–97)
MONOCYTES # BLD AUTO: 0.91 10*3/MM3 (ref 0.1–0.9)
MONOCYTES NFR BLD AUTO: 6.1 % (ref 5–12)
NEUTROPHILS NFR BLD AUTO: 11.09 10*3/MM3 (ref 1.7–7)
NEUTROPHILS NFR BLD AUTO: 74 % (ref 42.7–76)
NITRITE UR QL STRIP: NEGATIVE
NRBC BLD AUTO-RTO: 0 /100 WBC (ref 0–0.2)
PH UR STRIP.AUTO: <=5 [PH] (ref 5–8)
PLATELET # BLD AUTO: 434 10*3/MM3 (ref 140–450)
PMV BLD AUTO: 9.1 FL (ref 6–12)
POTASSIUM SERPL-SCNC: 4.9 MMOL/L (ref 3.5–5.2)
PROT SERPL-MCNC: 8.1 G/DL (ref 6–8.5)
PROT UR QL STRIP: ABNORMAL
RBC # BLD AUTO: 4.94 10*6/MM3 (ref 4.14–5.8)
RBC # UR STRIP: ABNORMAL /HPF
REF LAB TEST METHOD: ABNORMAL
SMALL PLATELETS BLD QL SMEAR: ADEQUATE
SODIUM SERPL-SCNC: 139 MMOL/L (ref 136–145)
SP GR UR STRIP: >=1.03 (ref 1–1.03)
SQUAMOUS #/AREA URNS HPF: ABNORMAL /HPF
TROPONIN T SERPL HS-MCNC: 19 NG/L
TROPONIN T SERPL HS-MCNC: 21 NG/L
UROBILINOGEN UR QL STRIP: ABNORMAL
WBC # UR STRIP: ABNORMAL /HPF
WBC MORPH BLD: NORMAL
WBC NRBC COR # BLD: 14.98 10*3/MM3 (ref 3.4–10.8)
WHOLE BLOOD HOLD COAG: NORMAL
WHOLE BLOOD HOLD SPECIMEN: NORMAL

## 2023-10-13 PROCEDURE — 36415 COLL VENOUS BLD VENIPUNCTURE: CPT

## 2023-10-13 PROCEDURE — 25010000002 DROPERIDOL PER 5 MG: Performed by: EMERGENCY MEDICINE

## 2023-10-13 PROCEDURE — 84484 ASSAY OF TROPONIN QUANT: CPT

## 2023-10-13 PROCEDURE — 93005 ELECTROCARDIOGRAM TRACING: CPT

## 2023-10-13 PROCEDURE — 85025 COMPLETE CBC W/AUTO DIFF WBC: CPT

## 2023-10-13 PROCEDURE — 25510000001 IOPAMIDOL 61 % SOLUTION: Performed by: EMERGENCY MEDICINE

## 2023-10-13 PROCEDURE — 25010000002 FENTANYL CITRATE (PF) 50 MCG/ML SOLUTION: Performed by: EMERGENCY MEDICINE

## 2023-10-13 PROCEDURE — 99285 EMERGENCY DEPT VISIT HI MDM: CPT

## 2023-10-13 PROCEDURE — 84484 ASSAY OF TROPONIN QUANT: CPT | Performed by: EMERGENCY MEDICINE

## 2023-10-13 PROCEDURE — 74177 CT ABD & PELVIS W/CONTRAST: CPT

## 2023-10-13 PROCEDURE — 96376 TX/PRO/DX INJ SAME DRUG ADON: CPT

## 2023-10-13 PROCEDURE — 96375 TX/PRO/DX INJ NEW DRUG ADDON: CPT

## 2023-10-13 PROCEDURE — 93005 ELECTROCARDIOGRAM TRACING: CPT | Performed by: EMERGENCY MEDICINE

## 2023-10-13 PROCEDURE — 80053 COMPREHEN METABOLIC PANEL: CPT

## 2023-10-13 PROCEDURE — 96374 THER/PROPH/DIAG INJ IV PUSH: CPT

## 2023-10-13 PROCEDURE — 85007 BL SMEAR W/DIFF WBC COUNT: CPT

## 2023-10-13 PROCEDURE — 25810000003 SODIUM CHLORIDE 0.9 % SOLUTION: Performed by: EMERGENCY MEDICINE

## 2023-10-13 PROCEDURE — 83690 ASSAY OF LIPASE: CPT

## 2023-10-13 PROCEDURE — 81001 URINALYSIS AUTO W/SCOPE: CPT | Performed by: EMERGENCY MEDICINE

## 2023-10-13 RX ORDER — SODIUM CHLORIDE 0.9 % (FLUSH) 0.9 %
10 SYRINGE (ML) INJECTION AS NEEDED
Status: DISCONTINUED | OUTPATIENT
Start: 2023-10-13 | End: 2023-10-14 | Stop reason: HOSPADM

## 2023-10-13 RX ORDER — FENTANYL CITRATE 50 UG/ML
50 INJECTION, SOLUTION INTRAMUSCULAR; INTRAVENOUS ONCE
Status: COMPLETED | OUTPATIENT
Start: 2023-10-13 | End: 2023-10-13

## 2023-10-13 RX ORDER — PANTOPRAZOLE SODIUM 40 MG/10ML
80 INJECTION, POWDER, LYOPHILIZED, FOR SOLUTION INTRAVENOUS ONCE
Status: COMPLETED | OUTPATIENT
Start: 2023-10-13 | End: 2023-10-13

## 2023-10-13 RX ORDER — OXYCODONE HYDROCHLORIDE 5 MG/1
10 TABLET ORAL ONCE
Status: COMPLETED | OUTPATIENT
Start: 2023-10-13 | End: 2023-10-13

## 2023-10-13 RX ORDER — OXYCODONE HYDROCHLORIDE AND ACETAMINOPHEN 5; 325 MG/1; MG/1
1 TABLET ORAL EVERY 6 HOURS PRN
Qty: 14 TABLET | Refills: 0 | Status: SHIPPED | OUTPATIENT
Start: 2023-10-13

## 2023-10-13 RX ORDER — ONDANSETRON 4 MG/1
4 TABLET, ORALLY DISINTEGRATING ORAL EVERY 6 HOURS PRN
Qty: 10 TABLET | Refills: 0 | Status: SHIPPED | OUTPATIENT
Start: 2023-10-13

## 2023-10-13 RX ORDER — DROPERIDOL 2.5 MG/ML
1.25 INJECTION, SOLUTION INTRAMUSCULAR; INTRAVENOUS ONCE
Status: COMPLETED | OUTPATIENT
Start: 2023-10-13 | End: 2023-10-13

## 2023-10-13 RX ORDER — ALUMINA, MAGNESIA, AND SIMETHICONE 2400; 2400; 240 MG/30ML; MG/30ML; MG/30ML
15 SUSPENSION ORAL ONCE
Status: COMPLETED | OUTPATIENT
Start: 2023-10-13 | End: 2023-10-13

## 2023-10-13 RX ADMIN — PANTOPRAZOLE SODIUM 80 MG: 40 INJECTION, POWDER, FOR SOLUTION INTRAVENOUS at 21:05

## 2023-10-13 RX ADMIN — DROPERIDOL 1.25 MG: 2.5 INJECTION, SOLUTION INTRAMUSCULAR; INTRAVENOUS at 21:05

## 2023-10-13 RX ADMIN — SODIUM CHLORIDE 1000 ML: 9 INJECTION, SOLUTION INTRAVENOUS at 21:07

## 2023-10-13 RX ADMIN — OXYCODONE HYDROCHLORIDE 10 MG: 5 TABLET ORAL at 23:49

## 2023-10-13 RX ADMIN — ALUMINUM HYDROXIDE, MAGNESIUM HYDROXIDE, AND DIMETHICONE 15 ML: 400; 400; 40 SUSPENSION ORAL at 21:05

## 2023-10-13 RX ADMIN — FENTANYL CITRATE 50 MCG: 50 INJECTION, SOLUTION INTRAMUSCULAR; INTRAVENOUS at 21:07

## 2023-10-13 RX ADMIN — IOPAMIDOL 65 ML: 612 INJECTION, SOLUTION INTRAVENOUS at 21:22

## 2023-10-13 RX ADMIN — FENTANYL CITRATE 50 MCG: 50 INJECTION, SOLUTION INTRAMUSCULAR; INTRAVENOUS at 23:01

## 2023-10-14 VITALS
SYSTOLIC BLOOD PRESSURE: 148 MMHG | DIASTOLIC BLOOD PRESSURE: 85 MMHG | HEART RATE: 102 BPM | BODY MASS INDEX: 18.49 KG/M2 | RESPIRATION RATE: 16 BRPM | HEIGHT: 68 IN | WEIGHT: 122 LBS | OXYGEN SATURATION: 97 % | TEMPERATURE: 98 F

## 2023-10-14 NOTE — ED PROVIDER NOTES
"  HPI: Redd Pitts is a 63 y.o. male who presents to the emergency department complaining of \"my ulcers causing me pain\".  Patient states that he has had a duodenal ulcer for many years.  The last several weeks pain has been much increased.  Cannot eat, has nausea, weight loss.  No fevers, vomiting or diarrhea.  Patient is scheduled for an endoscopy in a few weeks.  He does not drink alcohol currently.      REVIEW OF SYSTEMS: All other systems reviewed and are negative     PAST MEDICAL HISTORY:   Past Medical History:   Diagnosis Date    Allergic     Chronic back pain     GERD (gastroesophageal reflux disease)     Hx of fracture of fibula     right leg    Hx of fracture of finger     right hand    Hx of fracture of left hip     Hx of fracture of tibia     right    Hypertension     Migraines         FAMILY HISTORY:   History reviewed. No pertinent family history.     SOCIAL HISTORY:   Social History     Socioeconomic History    Marital status:    Tobacco Use    Smoking status: Every Day     Packs/day: 1     Types: Cigarettes   Substance and Sexual Activity    Alcohol use: No    Drug use: No    Sexual activity: Defer        SURGICAL HISTORY:   Past Surgical History:   Procedure Laterality Date    LUMBAR DECOMPRESSION  08/06/2015    Extension of fusion with decompression at the L3-L4 level (Dr. Young)    LUMBAR FUSION  2007    Fusion L4/5 (in [place])        ALLERGIES: Codeine       PHYSICAL EXAM:   VITAL SIGNS:   Vitals:    10/13/23 2302   BP: 149/91   Pulse: 104   Resp: 18   Temp:    SpO2: 98%      CONSTITUTIONAL: Awake, chronically ill, frail  HENT: Atraumatic, normocephalic, oral mucosa moist, airway patent. Nares patent without drainage. External ears normal.   EYES: Conjunctivae clear, EOMI, PERRL   NECK: Trachea midline, nontender, supple   CARDIOVASCULAR: Tachycardic, Normal rhythm.  PULMONARY/CHEST: Normal work of breathing. Clear to auscultation, no rhonchi, wheezes, or rales.  ABDOMINAL: " Nondistended, soft, tenderness across upper abdomen, no rebound or guarding.  NEUROLOGIC: Nonfocal, moves all four extremities, no gross sensory or motor deficits.   EXTREMITIES: No clubbing, cyanosis, or edema   SKIN: Warm, Dry, No erythema, No rash       ED COURSE / MEDICAL DECISION MAKING:     Redd Pitts is a 63 y.o. male who presents to the emergency department for evaluation of upper abdominal pain.  Chronically ill-appearing frail man in no distress with exam as above.  His vital signs are notable for tachycardia.  His blood pressure is normal.  His oxygen saturation is normal on room air at 9 9%.  His exam is notable for upper abdominal tenderness.  Will obtain labs and imaging.  Will give symptomatic treatment.  Disposition pending.    Differential diagnosis includes pancreatitis, ulcer disease, gastritis, GERD, malignancy among other etiologies.    ED Course as of 10/13/23 2339   Fri Oct 13, 2023   1911 EKG interpreted by me: Sinus tachycardia, no acute ST/T changes, this is an abnormal EKG [MP]      ED Course User Index  [MP] Leo Ga MD     Labs notable for mild leukocytosis, mild renal insufficiency, this is not significantly changed from previous.  Otherwise no acute or significant abnormality.  CT scan reveals findings consistent with ulcer disease, distended gallbladder with no secondary findings to suggest cholecystitis.  As noted above his LFTs are normal.  He still has some pain but overall improved.  We discussed possible admission for further monitoring, fluids and pain medication.  He prefers to go home and follow-up for EGD as previously scheduled.  I think that is a reasonable plan.  We discussed return precautions.  He is happy with this plan.    Final diagnoses:   Epigastric pain   Peptic ulcer disease        Leo Ga MD  10/13/23 2339

## 2024-02-20 ENCOUNTER — TRANSCRIBE ORDERS (OUTPATIENT)
Dept: ADMINISTRATIVE | Facility: HOSPITAL | Age: 64
End: 2024-02-20
Payer: MEDICARE

## 2024-02-20 DIAGNOSIS — M54.16 LUMBAR RADICULOPATHY: ICD-10-CM

## 2024-02-20 DIAGNOSIS — M25.551 RIGHT HIP PAIN: Primary | ICD-10-CM

## 2024-02-20 DIAGNOSIS — M54.50 LOW BACK PAIN, UNSPECIFIED BACK PAIN LATERALITY, UNSPECIFIED CHRONICITY, UNSPECIFIED WHETHER SCIATICA PRESENT: Primary | ICD-10-CM

## 2024-02-20 DIAGNOSIS — M25.551 RIGHT HIP PAIN: ICD-10-CM

## 2024-02-20 DIAGNOSIS — M54.50 LOW BACK PAIN, UNSPECIFIED BACK PAIN LATERALITY, UNSPECIFIED CHRONICITY, UNSPECIFIED WHETHER SCIATICA PRESENT: ICD-10-CM

## 2024-04-01 ENCOUNTER — OFFICE VISIT (OUTPATIENT)
Dept: GASTROENTEROLOGY | Facility: CLINIC | Age: 64
End: 2024-04-01
Payer: MEDICARE

## 2024-04-01 VITALS
DIASTOLIC BLOOD PRESSURE: 80 MMHG | OXYGEN SATURATION: 98 % | HEART RATE: 68 BPM | SYSTOLIC BLOOD PRESSURE: 142 MMHG | BODY MASS INDEX: 25.25 KG/M2 | WEIGHT: 166.6 LBS

## 2024-04-01 DIAGNOSIS — Z87.19 HISTORY OF PANCREATITIS: ICD-10-CM

## 2024-04-01 DIAGNOSIS — Z87.19 HISTORY OF LIVER FAILURE: ICD-10-CM

## 2024-04-01 DIAGNOSIS — K21.9 GASTROESOPHAGEAL REFLUX DISEASE, UNSPECIFIED WHETHER ESOPHAGITIS PRESENT: ICD-10-CM

## 2024-04-01 DIAGNOSIS — Z87.11 HISTORY OF PEPTIC ULCER DISEASE: ICD-10-CM

## 2024-04-01 DIAGNOSIS — N18.9 CHRONIC KIDNEY DISEASE, UNSPECIFIED CKD STAGE: ICD-10-CM

## 2024-04-01 DIAGNOSIS — R10.9 ABDOMINAL PAIN, UNSPECIFIED ABDOMINAL LOCATION: Primary | ICD-10-CM

## 2024-04-01 PROCEDURE — 99204 OFFICE O/P NEW MOD 45 MIN: CPT | Performed by: INTERNAL MEDICINE

## 2024-04-01 RX ORDER — GALCANEZUMAB 120 MG/ML
INJECTION, SOLUTION SUBCUTANEOUS
COMMUNITY

## 2024-04-01 RX ORDER — METHOCARBAMOL 750 MG/1
750 TABLET, FILM COATED ORAL 4 TIMES DAILY PRN
COMMUNITY

## 2024-04-01 RX ORDER — NIFEDIPINE 30 MG/1
30 TABLET, EXTENDED RELEASE ORAL DAILY
COMMUNITY
Start: 2023-10-24 | End: 2024-10-23

## 2024-04-01 NOTE — PROGRESS NOTES
New Patient Consult      Date: 2024   Patient Name: Redd Pitts  MRN: [id number]  : 1960     Referring Physician: Alex Millard*    Chief Complaint   Patient presents with    Liver Failure       History of Present Illness: Redd Pitts is a 63 y.o. male who is here today to establish care with Gastroenterology.    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 Was reportedly taking several Tylenol tablets every 2 hours for abdominal pain.    He has a history of peptic ulcer disease, and has abdominal pain secondary to that.  He also gets migraines and has to take medications for those, and also has chronic back and leg pain.    When he was evaluated at , he was found to have a high Tylenol level, severely deranged LFTs in the thousands.  He responded well to NAC, supportive care.  Subsequently discharged home.    CT scan done at that time revealed acute interstitial pancreatitis.  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  Consider outpatient CTA.    During that visit he also had an upper endoscopy, which did not reveal any obvious ulcers.    Is struggling with obtaining a good pain regimen.  He also has CKD, and he develops acute kidney injury when he takes high doses of NSAIDs.  He is at the time being, struggling to deal with various pain complaints as above as he cannot have Tylenol, or NSAIDs.  He is trying to establish with a pain management group.    Subjective      Past Medical History:   Diagnosis Date    Allergic     Chronic back pain     GERD (gastroesophageal reflux disease)     Hx of fracture of fibula     right leg    Hx of fracture of finger     right hand    Hx of fracture of left hip     Hx of fracture of tibia     right    Hypertension     Migraines        Past Surgical History:   Procedure Laterality Date    LUMBAR DECOMPRESSION  2015    Extension of fusion with  decompression at the L3-L4 level (Dr. Young)    LUMBAR FUSION  2007    Fusion L4/5 (in [place])       History reviewed. No pertinent family history.    Social History     Socioeconomic History    Marital status:    Tobacco Use    Smoking status: Every Day     Current packs/day: 1.00     Types: Cigarettes   Substance and Sexual Activity    Alcohol use: No    Drug use: No    Sexual activity: Defer         Current Outpatient Medications:     atorvastatin (LIPITOR) 20 MG tablet, Take 1 tablet by mouth Daily., Disp: , Rfl:     galcanezumab-gnlm (Emgality) 120 MG/ML auto-injector pen, Inject  under the skin into the appropriate area as directed., Disp: , Rfl:     hydrOXYzine (ATARAX) 25 MG tablet, Take 1 tablet by mouth 3 (Three) Times a Day As Needed for Itching., Disp: , Rfl:     Loratadine 10 MG capsule, Take 1 tablet/day by mouth., Disp: , Rfl:     methocarbamol (ROBAXIN) 750 MG tablet, Take 1 tablet by mouth 4 (Four) Times a Day As Needed for Muscle Spasms., Disp: , Rfl:     NIFEdipine XL (PROCARDIA XL) 30 MG 24 hr tablet, Take 1 tablet by mouth Daily., Disp: , Rfl:     SUMAtriptan (IMITREX) 100 MG tablet, Take 1 tablet by mouth Every 2 (Two) Hours As Needed for Migraine., Disp: , Rfl:     Allergies   Allergen Reactions    Codeine GI Intolerance       Review of Systems   Constitutional:  Negative for unexpected weight loss.   HENT:  Negative for trouble swallowing.    Gastrointestinal:  Negative for abdominal distention, abdominal pain, anal bleeding, blood in stool, constipation, diarrhea, nausea, rectal pain, vomiting, GERD and indigestion.       The following portions of the patient's history were reviewed and updated as appropriate: allergies, current medications, past family history, past medical history, past social history, past surgical history and problem list.    Objective     Physical Exam:  Vitals:    04/01/24 0842   BP: 142/80   Pulse: 68   SpO2: 98%   Weight: 75.6 kg (166 lb 9.6 oz)        Physical Exam  Constitutional:       General: He is not in acute distress.     Appearance: Normal appearance.   HENT:      Head: Normocephalic and atraumatic.      Mouth/Throat:      Mouth: Mucous membranes are moist.   Eyes:      General: No scleral icterus.     Conjunctiva/sclera: Conjunctivae normal.   Cardiovascular:      Rate and Rhythm: Normal rate.   Pulmonary:      Effort: Pulmonary effort is normal. No respiratory distress.   Abdominal:      General: There is no distension.      Tenderness: There is abdominal tenderness. There is no guarding.   Skin:     Capillary Refill: Capillary refill takes less than 2 seconds.      Coloration: Skin is not jaundiced or pale.   Neurological:      Mental Status: He is alert and oriented to person, place, and time.   Psychiatric:         Mood and Affect: Mood normal.         Behavior: Behavior normal.         Results Review:   I have reviewed the patient's new clinical and imaging results and agree with the interpretation.     No visits with results within 90 Day(s) from this visit.   Latest known visit with results is:   Admission on 10/13/2023, Discharged on 10/14/2023   Component Date Value Ref Range Status    Glucose 10/13/2023 70  65 - 99 mg/dL Final    BUN 10/13/2023 21  8 - 23 mg/dL Final    Creatinine 10/13/2023 1.61 (H)  0.76 - 1.27 mg/dL Final    Sodium 10/13/2023 139  136 - 145 mmol/L Final    Potassium 10/13/2023 4.9  3.5 - 5.2 mmol/L Final    Slight hemolysis detected by analyzer. Results may be affected.    Chloride 10/13/2023 102  98 - 107 mmol/L Final    CO2 10/13/2023 7.2 (L)  22.0 - 29.0 mmol/L Final    Calcium 10/13/2023 11.0 (H)  8.6 - 10.5 mg/dL Final    Total Protein 10/13/2023 8.1  6.0 - 8.5 g/dL Final    Albumin 10/13/2023 5.2  3.5 - 5.2 g/dL Final    ALT (SGPT) 10/13/2023 <5  1 - 41 U/L Final    AST (SGOT) 10/13/2023 14  1 - 40 U/L Final    Slight hemolysis detected by analyzer. Results may be affected.    Alkaline Phosphatase 10/13/2023 156  (H)  39 - 117 U/L Final    Total Bilirubin 10/13/2023 <0.2  0.0 - 1.2 mg/dL Final    Globulin 10/13/2023 2.9  gm/dL Final    A/G Ratio 10/13/2023 1.8  g/dL Final    BUN/Creatinine Ratio 10/13/2023 13.0  7.0 - 25.0 Final    Anion Gap 10/13/2023 29.8 (H)  5.0 - 15.0 mmol/L Final    eGFR 10/13/2023 48.4 (L)  >60.0 mL/min/1.73 Final    Lipase 10/13/2023 109 (H)  13 - 60 U/L Final    HS Troponin T 10/13/2023 21 (H)  <15 ng/L Final    Color, UA 10/13/2023 Yellow  Yellow, Straw Final    Appearance, UA 10/13/2023 Clear  Clear Final    pH, UA 10/13/2023 <=5.0  5.0 - 8.0 Final    Specific Gravity, UA 10/13/2023 >=1.030  1.005 - 1.030 Final    Glucose, UA 10/13/2023 Negative  Negative Final    Ketones, UA 10/13/2023 15 mg/dL (1+) (A)  Negative Final    Bilirubin, UA 10/13/2023 Negative  Negative Final    Blood, UA 10/13/2023 Negative  Negative Final    Protein, UA 10/13/2023 100 mg/dL (2+) (A)  Negative Final    Leuk Esterase, UA 10/13/2023 Negative  Negative Final    Nitrite, UA 10/13/2023 Negative  Negative Final    Urobilinogen, UA 10/13/2023 0.2 E.U./dL  0.2 - 1.0 E.U./dL Final    Extra Tube 10/13/2023 Hold for add-ons.   Final    Auto resulted.    Extra Tube 10/13/2023 hold for add-on   Final    Auto resulted    Extra Tube 10/13/2023 Hold for add-ons.   Final    Auto resulted    WBC 10/13/2023 14.98 (H)  3.40 - 10.80 10*3/mm3 Final    RBC 10/13/2023 4.94  4.14 - 5.80 10*6/mm3 Final    Hemoglobin 10/13/2023 16.9  13.0 - 17.7 g/dL Final    Hematocrit 10/13/2023 52.1 (H)  37.5 - 51.0 % Final    MCV 10/13/2023 105.5 (H)  79.0 - 97.0 fL Final    MCH 10/13/2023 34.2 (H)  26.6 - 33.0 pg Final    MCHC 10/13/2023 32.4  31.5 - 35.7 g/dL Final    RDW 10/13/2023 15.8 (H)  12.3 - 15.4 % Final    RDW-SD 10/13/2023 62.3 (H)  37.0 - 54.0 fl Final    MPV 10/13/2023 9.1  6.0 - 12.0 fL Final    Platelets 10/13/2023 434  140 - 450 10*3/mm3 Final    Neutrophil % 10/13/2023 74.0  42.7 - 76.0 % Final    Lymphocyte % 10/13/2023 18.2 (L)  19.6 -  45.3 % Final    Monocyte % 10/13/2023 6.1  5.0 - 12.0 % Final    Eosinophil % 10/13/2023 0.1 (L)  0.3 - 6.2 % Final    Basophil % 10/13/2023 0.3  0.0 - 1.5 % Final    Immature Grans % 10/13/2023 1.3 (H)  0.0 - 0.5 % Final    Neutrophils, Absolute 10/13/2023 11.09 (H)  1.70 - 7.00 10*3/mm3 Final    Lymphocytes, Absolute 10/13/2023 2.73  0.70 - 3.10 10*3/mm3 Final    Monocytes, Absolute 10/13/2023 0.91 (H)  0.10 - 0.90 10*3/mm3 Final    Eosinophils, Absolute 10/13/2023 0.01  0.00 - 0.40 10*3/mm3 Final    Basophils, Absolute 10/13/2023 0.05  0.00 - 0.20 10*3/mm3 Final    Immature Grans, Absolute 10/13/2023 0.19 (H)  0.00 - 0.05 10*3/mm3 Final    nRBC 10/13/2023 0.0  0.0 - 0.2 /100 WBC Final    Macrocytes 10/13/2023 Slight/1+  None Seen Final    WBC Morphology 10/13/2023 Normal  Normal Final    Platelet Estimate 10/13/2023 Adequate  Normal Final    RBC, UA 10/13/2023 None Seen  None Seen, 0-2 /HPF Final    WBC, UA 10/13/2023 None Seen  None Seen, 0-2 /HPF Final    Bacteria, UA 10/13/2023 Trace (A)  None Seen /HPF Final    Squamous Epithelial Cells, UA 10/13/2023 None Seen  None Seen, 0-2 /HPF Final    Hyaline Casts, UA 10/13/2023 None Seen  None Seen /LPF Final    Methodology 10/13/2023 Manual Light Microscopy   Final    HS Troponin T 10/13/2023 19 (H)  <15 ng/L Final      No radiology results for the last 90 days.    Assessment / Plan      Assessment & Plan:  Diagnoses and all orders for this visit:    1. Abdominal pain, unspecified abdominal location (Primary)  -     Case Request; Standing  -     Case Request  -     Comprehensive Metabolic Panel  -     Lipase  -     Cancel: CT Abdomen Pelvis Without Contrast; Future    2. History of liver failure  -     Case Request; Standing  -     Case Request  -     Comprehensive Metabolic Panel  -     Lipase  -     Cancel: CT Abdomen Pelvis Without Contrast; Future    3. History of pancreatitis  -     Case Request; Standing  -     Case Request  -     Comprehensive Metabolic  Panel  -     Lipase  -     Cancel: CT Abdomen Pelvis Without Contrast; Future    4. History of peptic ulcer disease  -     Case Request; Standing  -     Case Request  -     Comprehensive Metabolic Panel  -     Lipase  -     Cancel: CT Abdomen Pelvis Without Contrast; Future    5. Gastroesophageal reflux disease, unspecified whether esophagitis present  -     Comprehensive Metabolic Panel  -     Lipase  -     Cancel: CT Abdomen Pelvis Without Contrast; Future    6. Chronic kidney disease, unspecified CKD stage  -     Ambulatory Referral to Nephrology    Other orders  -     Follow Anesthesia Guidelines / Protocol; Standing  -     Follow Anesthesia Guidelines / Protocol; Future  -     Obtain Informed Consent; Future  -     Verify NPO; Standing          Unfortunately, he is in a tough situation.  He should not be taking Tylenol given his prior history of Tylenol toxicity and Tylenol associated liver failure.  Should also not be taking NSAIDs given his CKD, and tendency to have acute kidney injury because of that.    He is trying to establish with a pain management group, to obtain a narcotic-based, non-Tylenol, non-NSAID pain regimen.  He is agreeable to having a pain contract.    Given his ongoing epigastric pain, which is also tender to palpation, history of peptic ulcer disease, I would like to bring him in for an upper endoscopy.    Plan for EGD with monitored anesthesia care. Counseled in detail regarding the risks, benefits and alternatives of the procedure, including but not limited to perforation, bleeding, infection, post-operative pain, complications from anesthesia, aspiration, cardiac decompensation, need for further procedures or surgery, which may occur in approximately 1 in 1000 procedures. Counseled also that endoscopies are not perfect tests, and there is a possibility of missed diagnoses. Counseled regarding pre procedural instructions. Written instructions provided. Instructed to arrange for a ride  home for the day of procedure. Patient voiced understanding of the above, and agrees to proceed.    Would also like to repeat a CT scan.  Will await lab testing to ascertain if he can get IV contrast.  The prior CT scan suggested obtaining a CTA.    He understands the risk of contrast-induced nephropathy.      Follow Up:   No follow-ups on file.    Gerald Ramos MD  [facility]    4/1/2024  10:10 EDT    Part of this note may be an electronic transcription/translation of spoken language to printed text using the Dragon Dictation System.

## 2024-04-02 PROBLEM — Z87.19 HISTORY OF LIVER FAILURE: Status: ACTIVE | Noted: 2024-04-01

## 2024-04-02 PROBLEM — R10.9 ABDOMINAL PAIN: Status: ACTIVE | Noted: 2024-04-01

## 2024-04-02 PROBLEM — Z87.19 HISTORY OF PANCREATITIS: Status: ACTIVE | Noted: 2024-04-01

## 2024-04-02 PROBLEM — Z87.11 HISTORY OF PEPTIC ULCER DISEASE: Status: ACTIVE | Noted: 2024-04-01

## 2024-04-04 ENCOUNTER — PATIENT ROUNDING (BHMG ONLY) (OUTPATIENT)
Dept: GASTROENTEROLOGY | Facility: CLINIC | Age: 64
End: 2024-04-04
Payer: MEDICARE

## 2024-04-04 NOTE — PROGRESS NOTES
April 4, 2024    Hello, may I speak with Redd Pitts?    My name is Montserrat     I am  with [facility]  [address]  [place] [address] [phone].    Before we get started may I verify your date of birth? 1960    I am calling to officially welcome you to our practice and ask about your recent visit. Is this a good time to talk? yes    Tell me about your visit with us. What things went well?  5 star, the visit went good       We're always looking for ways to make our patients' experiences even better. Do you have recommendations on ways we may improve?  no    Overall were you satisfied with your first visit to our practice? yes       I appreciate you taking the time to speak with me today. Is there anything else I can do for you? no      Thank you, and have a great day.

## 2024-04-08 ENCOUNTER — TRANSCRIBE ORDERS (OUTPATIENT)
Dept: ADMINISTRATIVE | Facility: HOSPITAL | Age: 64
End: 2024-04-08
Payer: MEDICARE

## 2024-04-08 DIAGNOSIS — M54.12 CERVICAL RADICULOPATHY: ICD-10-CM

## 2024-04-08 DIAGNOSIS — M50.30 DDD (DEGENERATIVE DISC DISEASE), CERVICAL: ICD-10-CM

## 2024-04-08 DIAGNOSIS — M54.2 NECK PAIN: Primary | ICD-10-CM

## 2024-04-10 ENCOUNTER — TELEPHONE (OUTPATIENT)
Dept: GASTROENTEROLOGY | Facility: CLINIC | Age: 64
End: 2024-04-10
Payer: MEDICARE

## 2024-07-31 ENCOUNTER — TRANSCRIBE ORDERS (OUTPATIENT)
Dept: ADMINISTRATIVE | Facility: HOSPITAL | Age: 64
End: 2024-07-31
Payer: MEDICARE

## 2024-09-25 ENCOUNTER — TELEPHONE (OUTPATIENT)
Dept: UROLOGY | Facility: CLINIC | Age: 64
End: 2024-09-25
Payer: MEDICARE